# Patient Record
Sex: FEMALE | Race: BLACK OR AFRICAN AMERICAN | NOT HISPANIC OR LATINO
[De-identification: names, ages, dates, MRNs, and addresses within clinical notes are randomized per-mention and may not be internally consistent; named-entity substitution may affect disease eponyms.]

---

## 2021-08-05 ENCOUNTER — RESULT REVIEW (OUTPATIENT)
Age: 26
End: 2021-08-05

## 2022-08-06 ENCOUNTER — RESULT REVIEW (OUTPATIENT)
Age: 27
End: 2022-08-06

## 2022-10-03 ENCOUNTER — EMERGENCY (EMERGENCY)
Facility: HOSPITAL | Age: 27
LOS: 1 days | Discharge: HOME | End: 2022-10-03
Attending: EMERGENCY MEDICINE
Payer: COMMERCIAL

## 2022-10-03 VITALS
RESPIRATION RATE: 18 BRPM | SYSTOLIC BLOOD PRESSURE: 125 MMHG | DIASTOLIC BLOOD PRESSURE: 81 MMHG | WEIGHT: 190.04 LBS | HEART RATE: 62 BPM | TEMPERATURE: 98 F | HEIGHT: 64 IN | OXYGEN SATURATION: 99 %

## 2022-10-03 PROCEDURE — 12001 RPR S/N/AX/GEN/TRNK 2.5CM/<: CPT

## 2022-10-03 PROCEDURE — 99283 EMERGENCY DEPT VISIT LOW MDM: CPT | Mod: 25

## 2022-10-03 PROCEDURE — 99284 EMERGENCY DEPT VISIT MOD MDM: CPT | Mod: 25

## 2022-10-03 PROCEDURE — 90471 IMMUNIZATION ADMIN: CPT

## 2022-10-03 PROCEDURE — 99053 MED SERV 10PM-8AM 24 HR FAC: CPT

## 2022-10-03 PROCEDURE — 90715 TDAP VACCINE 7 YRS/> IM: CPT

## 2022-10-03 RX ORDER — TETANUS TOXOID, REDUCED DIPHTHERIA TOXOID AND ACELLULAR PERTUSSIS VACCINE, ADSORBED 5; 2.5; 8; 8; 2.5 [IU]/.5ML; [IU]/.5ML; UG/.5ML; UG/.5ML; UG/.5ML
0.5 SUSPENSION INTRAMUSCULAR ONCE
Refills: 0 | Status: COMPLETED | OUTPATIENT
Start: 2022-10-03 | End: 2022-10-03

## 2022-10-03 RX ADMIN — TETANUS TOXOID, REDUCED DIPHTHERIA TOXOID AND ACELLULAR PERTUSSIS VACCINE, ADSORBED 0.5 MILLILITER(S): 5; 2.5; 8; 8; 2.5 SUSPENSION INTRAMUSCULAR at 08:38

## 2022-10-03 NOTE — ED PROVIDER NOTE - OBJECTIVE STATEMENT
pt with complaint of laceration to right ankle after getting hit with a door at work today.  last tdap 5 years ago

## 2022-10-03 NOTE — ED PROVIDER NOTE - NSFOLLOWUPINSTRUCTIONS_ED_ALL_ED_FT
Care For Your Stitches    WHAT YOU NEED TO KNOW:    Stitches, or sutures, are used to close cuts and wounds on the skin. Stitches need to be removed after your wound has healed.           DISCHARGE INSTRUCTIONS:    Return to the emergency department if:   •Your stitches come apart.      •Blood soaks through your bandages.      •You suddenly cannot move your injured joint.      •You have sudden numbness around your wound.      •You see red streaks coming from your wound.      Contact your healthcare provider if:   •You have a fever and chills.      •Your wound is red, warm, swollen, or leaking pus.      •There is a bad smell coming from your wound.      •You have increased pain in the wound area.      •You have questions or concerns about your condition or care.      Care for your stitches:   •Protect the stitches. You may need to cover your stitches with a bandage for 24 to 48 hours, or as directed. Do not bump or hit the suture area. This could open the wound. Do not trim or shorten the ends of your stitches. If they rub on your clothing, put a gauze bandage between the stitches and your clothes.      •Clean the area as directed. Carefully wash your wound with soap and water. For mouth and lip wounds, rinse your mouth after meals and at bedtime. Ask your healthcare provider what to use to rinse your mouth. If you have a scalp wound, you may gently wash your hair every 2 days with mild shampoo. Do not use hair products, such as hair spray. Check your wound for signs of infection when you clean it. Signs include redness, swelling, and pus.      •Keep the area dry as directed. Wait 12 to 24 hours after you receive your stitches before you take a shower. Take showers instead of baths. Do not take a bath or swim. Your healthcare provider will give you instructions for bathing with your stitches.      Help your wound heal:   •Elevate your wound. Keep your wound above the level of your heart as often as you can. This will help decrease swelling and pain. Prop the area on pillows or blankets, if possible, to keep it elevated comfortably.      •Limit activity. Do not stretch the skin around your wound. This will help prevent bleeding and swelling.      Follow up with your healthcare provider as directed: You may need to return to have your stitches removed. Write down your questions so you remember to ask them during your visits.        © Copyright [employer] 2022

## 2022-10-03 NOTE — ED ADULT NURSE NOTE - CAS EDN DISCHARGE ASSESSMENT
Alert and oriented to person, place and time/Awake/Symptoms improved/No adverse reaction to first time med in ED Glycopyrrolate Pregnancy And Lactation Text: This medication is Pregnancy Category B and is considered safe during pregnancy. It is unknown if it is excreted breast milk.

## 2022-10-03 NOTE — ED PROVIDER NOTE - PATIENT PORTAL LINK FT
You can access the FollowMyHealth Patient Portal offered by [facility] by registering at the following website: [email or web address]. By joining [employer]’s FollowMyHealth portal, you will also be able to view your health information using other applications (apps) compatible with our system.

## 2022-10-04 ENCOUNTER — EMERGENCY (EMERGENCY)
Facility: HOSPITAL | Age: 27
LOS: 1 days | Discharge: HOME | End: 2022-10-04
Attending: EMERGENCY MEDICINE
Payer: SELF-PAY

## 2022-10-04 VITALS
DIASTOLIC BLOOD PRESSURE: 76 MMHG | OXYGEN SATURATION: 98 % | HEIGHT: 64 IN | HEART RATE: 82 BPM | SYSTOLIC BLOOD PRESSURE: 109 MMHG | WEIGHT: 190.04 LBS | TEMPERATURE: 98 F | RESPIRATION RATE: 18 BRPM

## 2022-10-04 PROCEDURE — 12001 RPR S/N/AX/GEN/TRNK 2.5CM/<: CPT

## 2022-10-04 PROCEDURE — 99283 EMERGENCY DEPT VISIT LOW MDM: CPT | Mod: 25

## 2022-10-04 PROCEDURE — 99282 EMERGENCY DEPT VISIT SF MDM: CPT | Mod: 25

## 2022-10-04 PROCEDURE — 29515 APPLICATION SHORT LEG SPLINT: CPT

## 2022-10-04 NOTE — ED PROVIDER NOTE - SKIN, MLM
Right lateral posterior foot with angular laceration with sutures in place. Posterior-most stitch appears to have torn through the edge of wound with small amount of oozing blood. Other sutures intact.

## 2022-10-04 NOTE — ED PROVIDER NOTE - NSFOLLOWUPINSTRUCTIONS_ED_ALL_ED_FT
inspect wound daily, remove splint daily. In several days you can start applying bacitracin ointment to the laceration. Return in 7 days for suture removal.   Sutured Wound Care      Sutures are stitches that can be used to close wounds. Sutures come in different materials. They may break down as your wound heals (absorbable), or they may need to be removed (nonabsorbable). Taking care of your wound properly can help to prevent pain and infection. It can also help your wound heal more quickly. Follow instructions from your health care provider about how to care for your sutured wound.      Supplies needed:    •Soap and water.      •A clean, dry towel.      •Wound cleanser or saline, if needed.      •A clean gauze or bandage (dressing), if needed.      •Antibiotic ointment, if told by your health care provider.        How to care for your sutured wound  Two stitched wounds. One is normal. The other is red with pus and infected.   •Keep the wound completely dry for the first 24 hours, or for as long as told by your health care provider. After 24–48 hours, you may shower or bathe as told by your health care provider. Do not soak or submerge the wound in water until the sutures have been removed.    •After the first 24 hours, clean the wound once a day, or as often as told by your health care provider. Use the following steps:  •Wash and rinse the wound as told by your health care provider.      •Pat the wound dry with a clean towel. Do not rub the wound.        •After cleaning the wound, apply a thin layer of antibiotic ointment as told by your health care provider. This will prevent infection and keep the dressing from sticking to the wound.    •Follow instructions from your health care provider about how to change your dressing. Make sure you:  •Wash your hands with soap and water for at least 20 seconds before and after you change your dressing. If soap and water are not available, use hand .      •Change your dressing at least once a day, or as often as told by your health care provider. If your dressing gets wet or dirty, change it.      •Leave sutures and other skin closures, such as adhesive strips or skin glue, in place. These skin closures may need to stay in place for 2 weeks or longer. If adhesive strip edges start to loosen and curl up, you may trim the loose edges. Do not remove adhesive strips completely unless your health care provider tells you to do that.      •Check your wound every day for signs of infection. Watch for:  •Redness, swelling, or pain.      •Fluid or blood.      •New warmth, a rash, or hardness at the wound site.      •Pus or a bad smell.        •Have the sutures removed as told by your health care provider.        Follow these instructions at home:    Medicines     •Take or apply over-the-counter and prescription medicines only as told by your health care provider.      •If you were prescribed an antibiotic medicine or ointment, take or apply it as told by your health care provider. Do not stop using the antibiotic even if your condition improves.      General instructions     •To help reduce scarring after your wound heals, cover your wound with clothing or apply sunscreen of at least 30 SPF whenever you are outside.      • Do not scratch or pick at your wound.      •Avoid stretching your wound.      •Raise (elevate) the injured area above the level of your heart while you are sitting or lying down, if possible.      •Eat a diet that includes protein, vitamin A, and vitamin C to help the wound heal.      •Drink enough fluid to keep your urine pale yellow.      •Keep all follow-up visits. This is important.        Contact a health care provider if:    •You received a tetanus shot and you have swelling, severe pain, redness, or bleeding at the injection site.      •Your wound breaks open or you notice something coming out if it, such as wood or glass.    •You have any of these signs of infection:  •Redness, swelling, or pain around your wound.      •Fluid or blood coming from your wound.      •New warmth, a rash, or hardness around the wound.      •A fever.        •The skin near your wound changes color.      •You have pain that does not get better with medicine.      •You develop numbness around the wound.        Get help right away if:    •You develop severe swelling or more pain around your wound.      •You have pus or a bad smell coming from your wound.      •You develop painful lumps near your wound or anywhere on your body.      •You have a red streak spreading out from your wound.    •The wound is on your hand or foot and:  •Your fingers or toes look pale or bluish.      •You cannot properly move a finger or toe.      •You have numbness that is spreading down your hand, foot, fingers, or toes.          Summary    •Sutures are stitches that can be used to close wounds.      •Taking care of your wound properly can help to prevent pain and infection.      •Keep the wound completely dry for the first 24 hours, or for as long as told by your health care provider. After 24–48 hours, you may shower or bathe as told by your health care provider.      •To help with healing, eat foods that are rich in protein, vitamin A, and vitamin C.      This information is not intended to replace advice given to you by your health care provider. Make sure you discuss any questions you have with your health care provider.

## 2022-10-04 NOTE — ED PROVIDER NOTE - PATIENT PORTAL LINK FT
You can access the FollowMyHealth Patient Portal offered by [facility] by registering at the following website: [email or web address]. By joining [employer]’s FollowMyHealth portal, you will also be able to view your health information using other applications (apps) compatible with our system.

## 2022-10-04 NOTE — ED ADULT TRIAGE NOTE - CHIEF COMPLAINT QUOTE
walked in for  wound check  pt states  that  she  had rt ankle  laceration yesterday and  was  stitched  yesterday  here in the  ED  , wound still bleeding as per  pt.

## 2022-10-04 NOTE — ED PROVIDER NOTE - CLINICAL SUMMARY MEDICAL DECISION MAKING FREE TEXT BOX
Patient with bleeding from suture round. Edges of wound reinforced with steri strips and posterior splint applied to reduce pressure on healing wound. Patient instructed to remove splint daily to inspect wound for signs of infection. Patient with mild bleeding from sutured round. Edges of wound reinforced with steri strips and posterior splint applied to reduce pressure on healing wound. Patient instructed to remove splint daily to inspect wound for signs of infection.

## 2022-10-04 NOTE — ED PROVIDER NOTE - OBJECTIVE STATEMENT
32 year old female with no significant PMHX presents to the ED with complaints of a bleeding laceration. Patient presented to this ED with complaints of a laceration to her right foot and was discharged after the wound was closed with sutures. Patient states that despite laceration repair she has noted persistent bleeding from the wound, prompting today's ED visit. Patient denies all other acute symptoms. NKDA.

## 2022-10-09 ENCOUNTER — EMERGENCY (EMERGENCY)
Facility: HOSPITAL | Age: 27
LOS: 1 days | Discharge: HOME | End: 2022-10-09
Attending: STUDENT IN AN ORGANIZED HEALTH CARE EDUCATION/TRAINING PROGRAM
Payer: COMMERCIAL

## 2022-10-09 VITALS
HEIGHT: 64 IN | RESPIRATION RATE: 19 BRPM | SYSTOLIC BLOOD PRESSURE: 151 MMHG | DIASTOLIC BLOOD PRESSURE: 76 MMHG | TEMPERATURE: 98 F | HEART RATE: 69 BPM | WEIGHT: 190.04 LBS | OXYGEN SATURATION: 100 %

## 2022-10-09 PROCEDURE — 99281 EMR DPT VST MAYX REQ PHY/QHP: CPT

## 2022-10-09 PROCEDURE — 99283 EMERGENCY DEPT VISIT LOW MDM: CPT

## 2022-10-09 NOTE — ED PROVIDER NOTE - OBJECTIVE STATEMENT
27-year-old female, presents for wound check status post laceration repair to the right foot 5 days ago.  Reports that the skin feels sensitive and she noticed some clear yellowish discharge earlier today.  Initially was given a posterior splint and she noticed when she uses the splint the pain is less and irritation is less but her job is not allowing her to wear the splint at work.  Denies any fever, chills, purulent discharge, swelling, redness, increased heat or any other complaints.

## 2022-10-09 NOTE — ED PROVIDER NOTE - ATTENDING APP SHARED VISIT CONTRIBUTION OF CARE
27 year old female with no pertinent PMH presents for wound check. Pt with right foot laceration 5 days ago after injury, presenting today with small area of wound dehiscence. On exam, no erythema, discharge, fevers, or skin changes. No signs of infection, vital signs non-actionable. Plan includes wound care per NP with return precautions.

## 2022-10-09 NOTE — ED ADULT NURSE NOTE - DOES PATIENT HAVE ADVANCE DIRECTIVE
Ongoing SW/CM Assessment/Plan of Care Note     See SW/CM flowsheets for goals and other objective data.    Patient/Family discharge goal (s):          Progress note:   Rec'd call from the  Re: pt needs PCP & Outpatient PT/OT. CM met w/pt @ bedside and verified pt's demographics. Informed pt that the CHW will follow up with her tomorrow to assist w/finding a new PCP. Questions encouraged and answered.     CM left voicemail for CHW for f/u.      No

## 2022-10-09 NOTE — ED PROVIDER NOTE - NS ED ATTENDING STATEMENT MOD
This was a shared visit with the FENG. I reviewed and verified the documentation and independently performed the documented:

## 2022-10-09 NOTE — ED PROVIDER NOTE - CLINICAL SUMMARY MEDICAL DECISION MAKING FREE TEXT BOX
Minimal dehiscence.  No signs of infection, abscess or cellulitis.  Localized pain and discomfort most likely due to the location of the sutures.  No indication for further treatment or prescriptions.  Will DC with return to the ER for suture removal and wound check.

## 2022-10-09 NOTE — ED PROVIDER NOTE - PATIENT PORTAL LINK FT
You can access the FollowMyHealth Patient Portal offered by [facility] by registering at the following website: [email or web address]. By joining [employer]’s FollowMyHealth portal, you will also be able to view your health information using other applications (apps) compatible with our system.

## 2022-10-09 NOTE — ED PROVIDER NOTE - NSFOLLOWUPINSTRUCTIONS_ED_ALL_ED_FT
Suture removal on Friday.    If you experience any new or worsening symptoms or if you are concerned you can always come back to the emergency for a re-evaluation.

## 2022-10-09 NOTE — ED PROVIDER NOTE - PHYSICAL EXAMINATION
Right foot lateral aspect of the heel with sutures in place.  Minimal dehiscence in the corner.  No discharge.  Wound edges without erythema induration.  Mild localized tenderness to palpation.  Right ankle full range of motion.  No tenderness over the Achilles tendon.  DP/PT pulses intact 2+.  Cap refill less than 2 seconds.  Calf compartments soft and nontender.

## 2022-10-10 ENCOUNTER — EMERGENCY (EMERGENCY)
Facility: HOSPITAL | Age: 27
LOS: 1 days | Discharge: HOME | End: 2022-10-10
Attending: EMERGENCY MEDICINE
Payer: COMMERCIAL

## 2022-10-10 VITALS
DIASTOLIC BLOOD PRESSURE: 62 MMHG | WEIGHT: 190.04 LBS | OXYGEN SATURATION: 100 % | SYSTOLIC BLOOD PRESSURE: 108 MMHG | HEIGHT: 64 IN | HEART RATE: 74 BPM | TEMPERATURE: 99 F | RESPIRATION RATE: 18 BRPM

## 2022-10-10 PROCEDURE — 73630 X-RAY EXAM OF FOOT: CPT | Mod: 26,RT

## 2022-10-10 PROCEDURE — 99283 EMERGENCY DEPT VISIT LOW MDM: CPT | Mod: 25

## 2022-10-10 PROCEDURE — 73630 X-RAY EXAM OF FOOT: CPT

## 2022-10-10 PROCEDURE — 99283 EMERGENCY DEPT VISIT LOW MDM: CPT

## 2022-10-10 NOTE — ED ADULT NURSE NOTE - OBJECTIVE STATEMENT
pt is a  26 y/o  female  with c/o  ankle pain. pt  have  a rt ankle  pain  and laceration x 1  week pt  wants  to have   xray of her ankle.

## 2022-10-10 NOTE — ED PROVIDER NOTE - CLINICAL SUMMARY MEDICAL DECISION MAKING FREE TEXT BOX
28 y/o F no pmhx presents w/ R ankle pain for the past 1 week. states she had a door close [place]  +healing wound w/ no erythema/discharge/edema over the lateral aspect of the R side of the foot, sutures in place, wound does not appear open. tender to the lateral portion of the R foot   DP2+, neurovascularly intact. xray to r/o fracture, low suspicion.

## 2022-10-10 NOTE — ED PROVIDER NOTE - PATIENT PORTAL LINK FT
You can access the FollowMyHealth Patient Portal offered by [facility] by registering at the following website: [email or web address]. By joining [employer]’s FollowMyHealth portal, you will also be able to view your health information using other applications (apps) compatible with our system.

## 2022-10-10 NOTE — ED PROVIDER NOTE - NSFOLLOWUPINSTRUCTIONS_ED_ALL_ED_FT
followup with podiatry for further evaluation. return to emergency department in 4 days for suture removal

## 2022-10-10 NOTE — ED PROVIDER NOTE - CARE PROVIDER_API CALL
Parth Ackerman (DPM)  Foot Surgery; Recon RearfootAnkle Surgery  [address] [place] [address]  Phone: [phone]  Fax: [phone]  Follow Up Time: 7-10 Days

## 2022-10-10 NOTE — ED PROVIDER NOTE - OBJECTIVE STATEMENT
26 y/o F no pmhx presents w/ R ankle pain for the past 1 week. states she had a door close on her [place] received stitches to the laceration and was seen here at  ER yesterday for evaluation of pain. was told to leave sutures in for another few days. endorsing pain w/ ambulation, improved w/ rest. denies new trauma. denies fever/chills. denies nausea/vomiting. denies changes in sensation.

## 2022-10-10 NOTE — ED PROVIDER NOTE - ATTENDING CONTRIBUTION TO CARE
26 y/o F no pmhx presents w/ R ankle pain for the past 1 week. states she had a door close on her [place].  +healing wound w/ no erythema/discharge/edema over the lateral aspect of the R side of the foot, sutures in place, wound does not appear open. tender to the lateral portion of the R foot   DP2+, neurovascularly intact. xray to r/o fracture, low suspicion.

## 2022-10-10 NOTE — ED PROVIDER NOTE - PHYSICAL EXAMINATION
General: Well appearing female in no acute distress  HEENT: Normocephalic, atraumatic. Moist mucous membranes. Oropharynx clear. No lymphadenopathy.  Eyes: No scleral icterus. EOMI. QUINTON.  Neck:. Soft and supple. Full ROM without pain. No midline tenderness  Cardiac: Regular rate and regular rhythm. No murmurs, rubs, gallops. Peripheral pulses 2+ and symmetric. No LE edema.  Resp: Lungs CTAB. Speaking in full sentences. No wheezes, rales or rhonchi.  Abd: Soft, non-tender, non-distended. No guarding or rebound. No scars, masses, or lesions.  Back: Spine midline and non-tender. No CVA tenderness.    Skin +healing wound w/ no erythema/discharge/edema over the lateral aspect of the R side of the foot, sutures in place, wound does not appear open. tender to the lateral portion of the R foot   Neuro: AO x 3. Moves all extremities symmetrically. Motor strength and sensation grossly intact.

## 2023-11-30 NOTE — ED PROCEDURE NOTE - LENGTH OF LACERATION
1.5 Continue Regimen: Metronidazole 0.75% cream as needed with flares Detail Level: Zone Render In Strict Bullet Format?: No

## 2024-09-27 ENCOUNTER — EMERGENCY (EMERGENCY)
Age: 29
LOS: 1 days | Discharge: HOME | End: 2024-09-27
Attending: STUDENT IN AN ORGANIZED HEALTH CARE EDUCATION/TRAINING PROGRAM | Admitting: STUDENT IN AN ORGANIZED HEALTH CARE EDUCATION/TRAINING PROGRAM
Payer: COMMERCIAL

## 2024-09-27 VITALS
TEMPERATURE: 98 F | OXYGEN SATURATION: 100 % | SYSTOLIC BLOOD PRESSURE: 109 MMHG | DIASTOLIC BLOOD PRESSURE: 73 MMHG | HEART RATE: 88 BPM | RESPIRATION RATE: 18 BRPM

## 2024-09-27 VITALS
DIASTOLIC BLOOD PRESSURE: 72 MMHG | RESPIRATION RATE: 15 BRPM | TEMPERATURE: 98 F | SYSTOLIC BLOOD PRESSURE: 109 MMHG | HEART RATE: 73 BPM | OXYGEN SATURATION: 99 %

## 2024-09-27 PROBLEM — E28.2 POLYCYSTIC OVARIAN SYNDROME: Chronic | Status: ACTIVE | Noted: 2022-10-09

## 2024-09-27 PROCEDURE — 73501 X-RAY EXAM HIP UNI 1 VIEW: CPT | Mod: 26,RT

## 2024-09-27 PROCEDURE — 73060 X-RAY EXAM OF HUMERUS: CPT | Mod: 26,RT

## 2024-09-27 PROCEDURE — 99284 EMERGENCY DEPT VISIT MOD MDM: CPT

## 2024-09-27 PROCEDURE — 70450 CT HEAD/BRAIN W/O DYE: CPT | Mod: 26,MC

## 2024-09-27 PROCEDURE — 73552 X-RAY EXAM OF FEMUR 2/>: CPT | Mod: 26,RT

## 2024-09-27 PROCEDURE — 73080 X-RAY EXAM OF ELBOW: CPT | Mod: 26,RT

## 2024-09-27 PROCEDURE — 73030 X-RAY EXAM OF SHOULDER: CPT | Mod: 26,RT

## 2024-09-27 PROCEDURE — 71046 X-RAY EXAM CHEST 2 VIEWS: CPT | Mod: 26

## 2024-09-27 PROCEDURE — 93010 ELECTROCARDIOGRAM REPORT: CPT

## 2024-09-27 PROCEDURE — 72125 CT NECK SPINE W/O DYE: CPT | Mod: 26,MC

## 2024-09-27 RX ORDER — ACETAMINOPHEN 325 MG/1
1 TABLET ORAL ONCE
Refills: 0 | Status: DISCONTINUED | OUTPATIENT
Start: 2024-09-27 | End: 2024-09-27

## 2024-09-27 RX ORDER — ACETAMINOPHEN 325 MG/1
975 TABLET ORAL ONCE
Refills: 0 | Status: COMPLETED | OUTPATIENT
Start: 2024-09-27 | End: 2024-09-27

## 2024-09-27 RX ORDER — ACETAMINOPHEN 325 MG/1
2 TABLET ORAL
Qty: 40 | Refills: 0
Start: 2024-09-27 | End: 2024-10-01

## 2024-09-27 RX ADMIN — ACETAMINOPHEN 975 MILLIGRAM(S): 325 TABLET ORAL at 10:45
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
## 2024-09-27 NOTE — ED ADULT NURSE NOTE - OBJECTIVE STATEMENT
pt with co neck, back , right shoulder and right leg pain. pt medicated per md orders awaiting further xrays.  Pt with no co sob no co chest pain.
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
## 2024-09-27 NOTE — ED PROVIDER NOTE - PHYSICAL EXAMINATION
General: Patient in NAD  HEENT: NCAT, EOMI, no oral lesions  CV: S1+S2, no m/r/g appreciated   Lungs: No respiratory distress, CTAB  Abd: Soft, nontender, no guarding, no rebound tenderness, + bowel sounds   : No suprapubic tenderness  Neuro: Alert and oriented to time, place and person. No focal deficits noted.   Ext: No cyanosis, no edema. Tenderness to palpation of lateral R thigh and R tricep  Skin: No rash, no phlebitis
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
